# Patient Record
Sex: FEMALE | Race: WHITE | Employment: PART TIME | URBAN - METROPOLITAN AREA
[De-identification: names, ages, dates, MRNs, and addresses within clinical notes are randomized per-mention and may not be internally consistent; named-entity substitution may affect disease eponyms.]

---

## 2017-04-20 ENCOUNTER — HOSPITAL ENCOUNTER (EMERGENCY)
Age: 57
Discharge: HOME | End: 2017-04-21
Attending: EMERGENCY MEDICINE
Payer: COMMERCIAL

## 2017-04-20 VITALS
SYSTOLIC BLOOD PRESSURE: 169 MMHG | OXYGEN SATURATION: 97 % | BODY MASS INDEX: 23.17 KG/M2 | TEMPERATURE: 98.3 F | DIASTOLIC BLOOD PRESSURE: 85 MMHG | RESPIRATION RATE: 16 BRPM | HEART RATE: 100 BPM | WEIGHT: 135 LBS

## 2017-04-20 DIAGNOSIS — N20.0 KIDNEY STONE: ICD-10-CM

## 2017-04-20 DIAGNOSIS — B37.31 MONILIAL VAGINITIS: ICD-10-CM

## 2017-04-20 DIAGNOSIS — N39.0 RECURRENT UTI: Primary | ICD-10-CM

## 2017-04-20 DIAGNOSIS — B37.49 CANDIDURIA: ICD-10-CM

## 2017-04-20 LAB
APPEARANCE UR: ABNORMAL
BACTERIA URNS QL MICRO: NEGATIVE /HPF
BILIRUB UR QL: NEGATIVE
COLOR UR: YELLOW
EPITH CASTS URNS QL MICRO: ABNORMAL /LPF (ref 0–5)
GLUCOSE UR STRIP.AUTO-MCNC: >1000 MG/DL
HGB UR QL STRIP: ABNORMAL
KETONES UR QL STRIP.AUTO: NEGATIVE MG/DL
LEUKOCYTE ESTERASE UR QL STRIP.AUTO: ABNORMAL
NITRITE UR QL STRIP.AUTO: NEGATIVE
PH UR STRIP: 5.5 [PH] (ref 5–8)
PROT UR STRIP-MCNC: NEGATIVE MG/DL
RBC #/AREA URNS HPF: ABNORMAL /HPF (ref 0–5)
SP GR UR REFRACTOMETRY: 1.02 (ref 1–1.03)
UROBILINOGEN UR QL STRIP.AUTO: 0.2 EU/DL (ref 0.2–1)
WBC URNS QL MICRO: ABNORMAL /HPF (ref 0–4)
YEAST URNS QL MICRO: ABNORMAL

## 2017-04-20 PROCEDURE — 87106 FUNGI IDENTIFICATION YEAST: CPT | Performed by: EMERGENCY MEDICINE

## 2017-04-20 PROCEDURE — 87086 URINE CULTURE/COLONY COUNT: CPT | Performed by: EMERGENCY MEDICINE

## 2017-04-20 PROCEDURE — 80053 COMPREHEN METABOLIC PANEL: CPT | Performed by: EMERGENCY MEDICINE

## 2017-04-20 PROCEDURE — 96374 THER/PROPH/DIAG INJ IV PUSH: CPT

## 2017-04-20 PROCEDURE — 81001 URINALYSIS AUTO W/SCOPE: CPT | Performed by: EMERGENCY MEDICINE

## 2017-04-20 PROCEDURE — 99283 EMERGENCY DEPT VISIT LOW MDM: CPT

## 2017-04-20 PROCEDURE — 74011250636 HC RX REV CODE- 250/636: Performed by: EMERGENCY MEDICINE

## 2017-04-20 PROCEDURE — 85025 COMPLETE CBC W/AUTO DIFF WBC: CPT | Performed by: EMERGENCY MEDICINE

## 2017-04-20 PROCEDURE — 96361 HYDRATE IV INFUSION ADD-ON: CPT

## 2017-04-20 PROCEDURE — 96375 TX/PRO/DX INJ NEW DRUG ADDON: CPT

## 2017-04-20 RX ORDER — ONDANSETRON 2 MG/ML
4 INJECTION INTRAMUSCULAR; INTRAVENOUS
Status: COMPLETED | OUTPATIENT
Start: 2017-04-20 | End: 2017-04-20

## 2017-04-20 RX ORDER — KETOROLAC TROMETHAMINE 30 MG/ML
30 INJECTION, SOLUTION INTRAMUSCULAR; INTRAVENOUS
Status: COMPLETED | OUTPATIENT
Start: 2017-04-20 | End: 2017-04-20

## 2017-04-20 RX ADMIN — SODIUM CHLORIDE 1000 ML: 900 INJECTION, SOLUTION INTRAVENOUS at 23:58

## 2017-04-20 RX ADMIN — ONDANSETRON 4 MG: 2 INJECTION INTRAMUSCULAR; INTRAVENOUS at 23:53

## 2017-04-20 RX ADMIN — KETOROLAC TROMETHAMINE 30 MG: 30 INJECTION, SOLUTION INTRAMUSCULAR at 23:54

## 2017-04-21 LAB
ALBUMIN SERPL BCP-MCNC: 3.9 G/DL (ref 3.4–5)
ALBUMIN/GLOB SERPL: 0.9 {RATIO} (ref 0.8–1.7)
ALP SERPL-CCNC: 83 U/L (ref 45–117)
ALT SERPL-CCNC: 16 U/L (ref 13–56)
ANION GAP BLD CALC-SCNC: 11 MMOL/L (ref 3–18)
AST SERPL W P-5'-P-CCNC: 11 U/L (ref 15–37)
BASOPHILS # BLD AUTO: 0 K/UL (ref 0–0.06)
BASOPHILS # BLD: 1 % (ref 0–2)
BILIRUB SERPL-MCNC: 0.4 MG/DL (ref 0.2–1)
BUN SERPL-MCNC: 19 MG/DL (ref 7–18)
BUN/CREAT SERPL: 18 (ref 12–20)
CALCIUM SERPL-MCNC: 9.9 MG/DL (ref 8.5–10.1)
CHLORIDE SERPL-SCNC: 102 MMOL/L (ref 100–108)
CO2 SERPL-SCNC: 25 MMOL/L (ref 21–32)
CREAT SERPL-MCNC: 1.05 MG/DL (ref 0.6–1.3)
DIFFERENTIAL METHOD BLD: ABNORMAL
EOSINOPHIL # BLD: 0.3 K/UL (ref 0–0.4)
EOSINOPHIL NFR BLD: 3 % (ref 0–5)
ERYTHROCYTE [DISTWIDTH] IN BLOOD BY AUTOMATED COUNT: 14.6 % (ref 11.6–14.5)
GLOBULIN SER CALC-MCNC: 4.5 G/DL (ref 2–4)
GLUCOSE SERPL-MCNC: 151 MG/DL (ref 74–99)
HCT VFR BLD AUTO: 35.6 % (ref 35–45)
HGB BLD-MCNC: 11.8 G/DL (ref 12–16)
LYMPHOCYTES # BLD AUTO: 18 % (ref 21–52)
LYMPHOCYTES # BLD: 1.6 K/UL (ref 0.9–3.6)
MCH RBC QN AUTO: 28.3 PG (ref 24–34)
MCHC RBC AUTO-ENTMCNC: 33.1 G/DL (ref 31–37)
MCV RBC AUTO: 85.4 FL (ref 74–97)
MONOCYTES # BLD: 0.5 K/UL (ref 0.05–1.2)
MONOCYTES NFR BLD AUTO: 6 % (ref 3–10)
NEUTS SEG # BLD: 6.4 K/UL (ref 1.8–8)
NEUTS SEG NFR BLD AUTO: 72 % (ref 40–73)
PLATELET # BLD AUTO: 426 K/UL (ref 135–420)
PMV BLD AUTO: 10 FL (ref 9.2–11.8)
POTASSIUM SERPL-SCNC: 3.5 MMOL/L (ref 3.5–5.5)
PROT SERPL-MCNC: 8.4 G/DL (ref 6.4–8.2)
RBC # BLD AUTO: 4.17 M/UL (ref 4.2–5.3)
SODIUM SERPL-SCNC: 138 MMOL/L (ref 136–145)
WBC # BLD AUTO: 8.8 K/UL (ref 4.6–13.2)

## 2017-04-21 PROCEDURE — 74011250637 HC RX REV CODE- 250/637: Performed by: EMERGENCY MEDICINE

## 2017-04-21 RX ORDER — FLUCONAZOLE 200 MG/1
200 TABLET ORAL DAILY
Qty: 14 TAB | Refills: 0 | Status: SHIPPED | OUTPATIENT
Start: 2017-04-21 | End: 2017-05-05

## 2017-04-21 RX ORDER — ONDANSETRON 4 MG/1
4 TABLET, ORALLY DISINTEGRATING ORAL
Qty: 8 TAB | Refills: 0 | Status: SHIPPED | OUTPATIENT
Start: 2017-04-21 | End: 2018-09-22

## 2017-04-21 RX ORDER — HYDROCODONE BITARTRATE AND ACETAMINOPHEN 5; 325 MG/1; MG/1
1 TABLET ORAL
Qty: 6 TAB | Refills: 0 | Status: SHIPPED | OUTPATIENT
Start: 2017-04-21 | End: 2017-08-04

## 2017-04-21 RX ORDER — ACETAMINOPHEN 500 MG
1000 TABLET ORAL
Status: COMPLETED | OUTPATIENT
Start: 2017-04-21 | End: 2017-04-21

## 2017-04-21 RX ORDER — FLUCONAZOLE 150 MG/1
150 TABLET ORAL
Status: COMPLETED | OUTPATIENT
Start: 2017-04-21 | End: 2017-04-21

## 2017-04-21 RX ADMIN — ACETAMINOPHEN 1000 MG: 500 TABLET ORAL at 00:22

## 2017-04-21 RX ADMIN — FLUCONAZOLE 150 MG: 150 TABLET ORAL at 00:22

## 2017-04-21 NOTE — ED PROVIDER NOTES
HPI Comments: Arnav Payan is a 65 y.o. Female with h/o niddm with c/o worsening urinary urgency, freq, vaginal burning, itching for last several days. Seen at [facility] in feb with noted uti, yeast in urine. Completed abx and was given 4 pills by pcp for yeast infection but this did not relieve sx. Tried otc monistat without relief. Now with nausea, increased right flank pain. No fever, diarrhea, blood in urine, cough, sob. Pt is not sexually active and has h/o hysterectomy in remote past. Sx are freq, worse with urination    The history is provided by the patient and medical records. Past Medical History:   Diagnosis Date    Diabetes ([facility] [address]     Kidney stone     Left sided abdominal pain of unknown cause        Past Surgical History:   Procedure Laterality Date    HX CARPAL TUNNEL RELEASE Right     HX  SECTION      HX CHOLECYSTECTOMY      HX HYSTERECTOMY           Family History:   Problem Relation Age of Onset    Parkinson's Disease Mother        Social History     Social History    Marital status:      Spouse name: N/A    Number of children: N/A    Years of education: N/A     Occupational History    Not on file. Social History Main Topics    Smoking status: Never Smoker    Smokeless tobacco: Not on file    Alcohol use No    Drug use: Not on file    Sexual activity: Not on file     Other Topics Concern    Not on file     Social History Narrative         ALLERGIES: Statins-hmg-coa reductase inhibitors    Review of Systems   Constitutional: Negative for fever. HENT: Negative for sore throat. Eyes: Negative for visual disturbance. Respiratory: Negative for cough and shortness of breath. Cardiovascular: Negative for chest pain. Gastrointestinal: Positive for nausea. Negative for constipation, diarrhea and vomiting. Endocrine: Negative for polyuria. Genitourinary: Positive for flank pain, frequency, urgency, vaginal discharge and vaginal pain.  Negative for difficulty urinating, dysuria, hematuria and pelvic pain. Musculoskeletal: Negative for back pain. Skin: Negative for rash. Neurological: Negative for syncope. Psychiatric/Behavioral: Positive for sleep disturbance. All other systems reviewed and are negative. Vitals:    04/20/17 2248   BP: 169/85   Pulse: 100   Resp: 16   Temp: 98.3 °F (36.8 °C)   SpO2: 97%   Weight: 61.2 kg (135 lb)            Physical Exam   Constitutional: She is oriented to person, place, and time. She appears well-developed and well-nourished. No distress. HENT:   Head: Normocephalic and atraumatic. Right Ear: External ear normal.   Left Ear: External ear normal.   Nose: Nose normal.   Mouth/Throat: Uvula is midline, oropharynx is clear and moist and mucous membranes are normal.   Eyes: Conjunctivae are normal. No scleral icterus. Neck: Neck supple. Cardiovascular: Normal rate, regular rhythm, normal heart sounds and intact distal pulses. Pulmonary/Chest: Effort normal and breath sounds normal.   Abdominal: Soft. Normal appearance. She exhibits no distension and no mass. There is no hepatosplenomegaly. There is tenderness in the suprapubic area. There is CVA tenderness. There is no rebound and no guarding. Musculoskeletal: She exhibits no edema. Neurological: She is alert and oriented to person, place, and time. Gait normal.   Skin: Skin is warm and dry. She is not diaphoretic. Psychiatric: Her behavior is normal.   Nursing note and vitals reviewed.        [facility]  ED Course       Procedures  Vitals:  Patient Vitals for the past 12 hrs:   Temp Pulse Resp BP SpO2   04/20/17 2248 98.3 °F (36.8 °C) 100 16 169/85 97 %         Medications ordered:   Medications   sodium chloride 0.9 % bolus infusion 1,000 mL (1,000 mL IntraVENous New Bag 4/20/17 7664)   ketorolac (TORADOL) injection 30 mg (30 mg IntraVENous Given 4/20/17 6894)   ondansetron (ZOFRAN) injection 4 mg (4 mg IntraVENous Given 4/20/17 9989)   fluconazole (DIFLUCAN) tablet 150 mg (150 mg Oral Given 4/21/17 0022)   acetaminophen (TYLENOL) tablet 1,000 mg (1,000 mg Oral Given 4/21/17 0022)         Lab findings:  Recent Results (from the past 12 hour(s))   URINALYSIS W/ RFLX MICROSCOPIC    Collection Time: 04/20/17 11:15 PM   Result Value Ref Range    Color YELLOW      Appearance CLOUDY      Specific gravity 1.018 1.005 - 1.030      pH (UA) 5.5 5.0 - 8.0      Protein NEGATIVE  NEG mg/dL    Glucose >1000 (A) NEG mg/dL    Ketone NEGATIVE  NEG mg/dL    Bilirubin NEGATIVE  NEG      Blood SMALL (A) NEG      Urobilinogen 0.2 0.2 - 1.0 EU/dL    Nitrites NEGATIVE  NEG      Leukocyte Esterase MODERATE (A) NEG     URINE MICROSCOPIC ONLY    Collection Time: 04/20/17 11:15 PM   Result Value Ref Range    WBC TOO NUMEROUS TO COUNT 0 - 4 /hpf    RBC 0 to 3 0 - 5 /hpf    Epithelial cells 1+ 0 - 5 /lpf    Bacteria NEGATIVE  NEG /hpf    Yeast 3+ (A) NEG   CBC WITH AUTOMATED DIFF    Collection Time: 04/20/17 11:45 PM   Result Value Ref Range    WBC 8.8 4.6 - 13.2 K/uL    RBC 4.17 (L) 4.20 - 5.30 M/uL    HGB 11.8 (L) 12.0 - 16.0 g/dL    HCT 35.6 35.0 - 45.0 %    MCV 85.4 74.0 - 97.0 FL    MCH 28.3 24.0 - 34.0 PG    MCHC 33.1 31.0 - 37.0 g/dL    RDW 14.6 (H) 11.6 - 14.5 %    PLATELET 786 (H) 262 - 420 K/uL    MPV 10.0 9.2 - 11.8 FL    NEUTROPHILS 72 40 - 73 %    LYMPHOCYTES 18 (L) 21 - 52 %    MONOCYTES 6 3 - 10 %    EOSINOPHILS 3 0 - 5 %    BASOPHILS 1 0 - 2 %    ABS. NEUTROPHILS 6.4 1.8 - 8.0 K/UL    ABS. LYMPHOCYTES 1.6 0.9 - 3.6 K/UL    ABS. MONOCYTES 0.5 0.05 - 1.2 K/UL    ABS. EOSINOPHILS 0.3 0.0 - 0.4 K/UL    ABS.  BASOPHILS 0.0 0.0 - 0.06 K/UL    DF AUTOMATED     METABOLIC PANEL, COMPREHENSIVE    Collection Time: 04/20/17 11:45 PM   Result Value Ref Range    Sodium 138 136 - 145 mmol/L    Potassium 3.5 3.5 - 5.5 mmol/L    Chloride 102 100 - 108 mmol/L    CO2 25 21 - 32 mmol/L    Anion gap 11 3.0 - 18 mmol/L    Glucose 151 (H) 74 - 99 mg/dL    BUN 19 (H) 7.0 - 18 MG/DL    Creatinine 1. 05 0.6 - 1.3 MG/DL    BUN/Creatinine ratio 18 12 - 20      GFR est AA >60 >60 ml/min/1.73m2    GFR est non-AA 54 (L) >60 ml/min/1.73m2    Calcium 9.9 8.5 - 10.1 MG/DL    Bilirubin, total 0.4 0.2 - 1.0 MG/DL    ALT (SGPT) 16 13 - 56 U/L    AST (SGOT) 11 (L) 15 - 37 U/L    Alk. phosphatase 83 45 - 117 U/L    Protein, total 8.4 (H) 6.4 - 8.2 g/dL    Albumin 3.9 3.4 - 5.0 g/dL    Globulin 4.5 (H) 2.0 - 4.0 g/dL    A-G Ratio 0.9 0.8 - 1.7         EKG interpretation by ED Physician:      X-Ray, CT or other radiology findings or impressions:  No orders to display     2/23 sentara: 1. Mild right hydronephrosis and hydroureter with no obstructing stones identified.  Probably recently passed tiny stone with mild residual edema. 2. No obstructive uropathy in the left kidney. Progress notes, Consult notes or additional Procedure notes:   Given persistent yeast in urine will place on 2 weeks of diflucan pending cultures which was d/w pt along with need for f/u with urology; if has resistant candida may need other therapy    Reevaluation of patient:   Stable for dc    Disposition:  Diagnosis:   1. Recurrent UTI    2. Monilial vaginitis    3. Candiduria    4. Kidney stone        Disposition: home      Follow-up Information     Follow up With Details Comments [address] [facility] MD [address]  [address] [place] [address]  C/Lacho Finn MD Schedule an appointment as soon as possible for a visit  [address] [place] [address]  [phone]              Patient's Medications   Start Taking    FLUCONAZOLE (DIFLUCAN) 200 MG TABLET    Take 1 Tab by mouth daily for 14 days. FDA advises cautious prescribing of oral fluconazole in pregnancy. Continue Taking    ASPIRIN DELAYED-RELEASE 81 MG TABLET    Take  by mouth daily. DAPAGLIFLOZIN (FARXIGA) 10 MG TAB    Take 10 mg by mouth daily.     ERGOCALCIFEROL (ERGOCALCIFEROL) 50,000 UNIT CAPSULE TAKE 1 CAP BY MOUTH ONCE A WEEK    FLUCONAZOLE (DIFLUCAN) 100 MG TABLET    Take two tablets po for 1 day, 1 tablet po for 2 days. GLIPIZIDE (GLUCOTROL) 10 MG TABLET    TAKE 1 TABLET BY MOUTH TWICE A DAY WITH MEALS FOR DIABETES    METFORMIN (GLUCOPHAGE) 1,000 MG TABLET    TAKE 1 TABLET BY MOUTH TWICE A DAY    MULTIVITAMIN (ONE A DAY) TABLET    Take 1 Tab by mouth daily. OMEPRAZOLE (PRILOSEC) 40 MG CAPSULE    TAKE ONE CAPSULE BY MOUTH 1/2 HOUR BEFORE BREAKFAST   These Medications have changed    Modified Medication Previous Medication    HYDROCODONE-ACETAMINOPHEN (NORCO) 5-325 MG PER TABLET HYDROcodone-acetaminophen (NORCO) 5-325 mg per tablet       Take 1 Tab by mouth every six (6) hours as needed for Pain. Max Daily Amount: 4 Tabs. Take 1 Tab by mouth every six (6) hours as needed for Pain. Max Daily Amount: 4 Tabs. Stop Taking    HYDROCODONE-ACETAMINOPHEN (NORCO) 5-325 MG PER TABLET    Take 1 Tab by mouth every four (4) hours as needed for Pain. Max Daily Amount: 6 Tabs.     IBUPROFEN (MOTRIN) 800 MG TABLET    TAKE 1 TABLET BY MOUTH EVERY 8 HOURS WITH FOOD FOR INFLAMMATION    ONDANSETRON (ZOFRAN ODT) 4 MG DISINTEGRATING TABLET    DISSOLVE 1 TAB ON TONGUE EVERY 6 HOURS AS NEEDED FOR NAUSEA    OXYCODONE-ACETAMINOPHEN (PERCOCET) 5-325 MG PER TABLET    TAKE 1-2 TABLETS BY MOUTH EVERY 4-6 HOURS IF NEEDED FOR PAIN

## 2017-04-21 NOTE — ED TRIAGE NOTES
\"I think its a urinary tract infection. I took some flomax last night and it went away.   I have a yeast infection that Im trying to get rid of.\"

## 2017-04-25 LAB
BACTERIA SPEC CULT: ABNORMAL
BACTERIA SPEC CULT: ABNORMAL
SERVICE CMNT-IMP: ABNORMAL

## 2018-06-05 ENCOUNTER — HOSPITAL ENCOUNTER (EMERGENCY)
Age: 58
Discharge: HOME | End: 2018-06-05
Attending: EMERGENCY MEDICINE
Payer: SELF-PAY

## 2018-06-05 VITALS
HEIGHT: 64 IN | DIASTOLIC BLOOD PRESSURE: 74 MMHG | OXYGEN SATURATION: 100 % | HEART RATE: 72 BPM | TEMPERATURE: 98.6 F | WEIGHT: 132 LBS | SYSTOLIC BLOOD PRESSURE: 137 MMHG | BODY MASS INDEX: 22.53 KG/M2 | RESPIRATION RATE: 16 BRPM

## 2018-06-05 DIAGNOSIS — N39.0 ACUTE UTI (URINARY TRACT INFECTION): Primary | ICD-10-CM

## 2018-06-05 LAB
APPEARANCE UR: ABNORMAL
BACTERIA URNS QL MICRO: ABNORMAL /HPF
BILIRUB UR QL: NEGATIVE
COLOR UR: YELLOW
EPITH CASTS URNS QL MICRO: ABNORMAL /LPF (ref 0–5)
GLUCOSE UR STRIP.AUTO-MCNC: >1000 MG/DL
HGB UR QL STRIP: NEGATIVE
KETONES UR QL STRIP.AUTO: NEGATIVE MG/DL
LEUKOCYTE ESTERASE UR QL STRIP.AUTO: ABNORMAL
NITRITE UR QL STRIP.AUTO: NEGATIVE
PH UR STRIP: 5.5 [PH] (ref 5–8)
PROT UR STRIP-MCNC: ABNORMAL MG/DL
RBC #/AREA URNS HPF: ABNORMAL /HPF (ref 0–5)
SP GR UR REFRACTOMETRY: 1.02 (ref 1–1.03)
UROBILINOGEN UR QL STRIP.AUTO: 0.2 EU/DL (ref 0.2–1)
WBC URNS QL MICRO: ABNORMAL /HPF (ref 0–4)

## 2018-06-05 PROCEDURE — 87086 URINE CULTURE/COLONY COUNT: CPT | Performed by: NURSE PRACTITIONER

## 2018-06-05 PROCEDURE — 81001 URINALYSIS AUTO W/SCOPE: CPT | Performed by: NURSE PRACTITIONER

## 2018-06-05 PROCEDURE — 99282 EMERGENCY DEPT VISIT SF MDM: CPT

## 2018-06-05 RX ORDER — FLUCONAZOLE 150 MG/1
TABLET ORAL
Qty: 1 TAB | Refills: 0 | Status: SHIPPED | OUTPATIENT
Start: 2018-06-05 | End: 2018-09-22

## 2018-06-05 RX ORDER — SULFAMETHOXAZOLE AND TRIMETHOPRIM 800; 160 MG/1; MG/1
1 TABLET ORAL 2 TIMES DAILY
Qty: 14 TAB | Refills: 0 | Status: SHIPPED | OUTPATIENT
Start: 2018-06-05 | End: 2018-09-22

## 2018-06-05 RX ORDER — IBUPROFEN 800 MG/1
800 TABLET ORAL
Qty: 20 TAB | Refills: 0 | Status: SHIPPED | OUTPATIENT
Start: 2018-06-05 | End: 2018-06-12

## 2018-06-05 NOTE — DISCHARGE INSTRUCTIONS
Urinary Tract Infection in Women: Care Instructions  Your Care Instructions    A urinary tract infection, or UTI, is a general term for an infection anywhere between the kidneys and the urethra (where urine comes out). Most UTIs are bladder infections. They often cause pain or burning when you urinate. UTIs are caused by bacteria and can be cured with antibiotics. Be sure to complete your treatment so that the infection goes away. Follow-up care is a key part of your treatment and safety. Be sure to make and go to all appointments, and call your doctor if you are having problems. It's also a good idea to know your test results and keep a list of the medicines you take. How can you care for yourself at home? · Take your antibiotics as directed. Do not stop taking them just because you feel better. You need to take the full course of antibiotics. · Drink extra water and other fluids for the next day or two. This may help wash out the bacteria that are causing the infection. (If you have kidney, heart, or liver disease and have to limit fluids, talk with your doctor before you increase your fluid intake.)  · Avoid drinks that are carbonated or have caffeine. They can irritate the bladder. · Urinate often. Try to empty your bladder each time. · To relieve pain, take a hot bath or lay a heating pad set on low over your lower belly or genital area. Never go to sleep with a heating pad in place. To prevent UTIs  · Drink plenty of water each day. This helps you urinate often, which clears bacteria from your system. (If you have kidney, heart, or liver disease and have to limit fluids, talk with your doctor before you increase your fluid intake.)  · Urinate when you need to. · Urinate right after you have sex. · Change sanitary pads often. · Avoid douches, bubble baths, feminine hygiene sprays, and other feminine hygiene products that have deodorants.   · After going to the bathroom, wipe from front to back.  When should you call for help? Call your doctor now or seek immediate medical care if:  ? · Symptoms such as fever, chills, nausea, or vomiting get worse or appear for the first time. ? · You have new pain in your back just below your rib cage. This is called flank pain. ? · There is new blood or pus in your urine. ? · You have any problems with your antibiotic medicine. ? Watch closely for changes in your health, and be sure to contact your doctor if:  ? · You are not getting better after taking an antibiotic for 2 days. ? · Your symptoms go away but then come back. Where can you learn more? Go to http://song-tigre.info/. Enter M186 in the search box to learn more about \"Urinary Tract Infection in Women: Care Instructions. \"  Current as of: May 12, 2017  Content Version: 11.4  © 8811-8697 [employer]. Care instructions adapted under license by [employer] (which disclaims liability or warranty for this information). If you have questions about a medical condition or this instruction, always ask your healthcare professional. [address] any warranty or liability for your use of this information. [employer] Activation    Thank you for requesting access to [facility]. Please follow the instructions below to securely access and download your online medical record. [employer] allows you to send messages to your doctor, view your test results, renew your prescriptions, schedule appointments, and more. How Do I Sign Up? 1. In your internet browser, go to [email or web address]  2. Click on the First Time User? Click Here link in the Sign In box. You will be redirect to the New Member Sign Up page. 3. Enter your [facility] Access Code exactly as it appears below. You will not need to use this code after youve completed the sign-up process. If you do not sign up before the expiration date, you must request a new code.     MyChart Access Code: [email or web address]  Expires: 9/3/2018 11:10 AM (This is the date your [employer] access code will )    4. Enter the last four digits of your Social Security Number (xxxx) and Date of Birth (mm/dd/yyyy) as indicated and click Submit. You will be taken to the next sign-up page. 5. Create a iContainerst ID. This will be your [employer] login ID and cannot be changed, so think of one that is secure and easy to remember. 6. Create a [employer] password. You can change your password at any time. 7. Enter your Password Reset Question and Answer. This can be used at a later time if you forget your password. 8. Enter your e-mail address. You will receive e-mail notification when new information is available in [address] 9. Click Sign Up. You can now view and download portions of your medical record. 10. Click the Download Summary menu link to download a portable copy of your medical information. Additional Information    If you have questions, please visit the Frequently Asked Questions section of the [employer] website at [email or web address] Remember, [employer] is NOT to be used for urgent needs. For medical emergencies, dial 911.

## 2018-06-05 NOTE — ED PROVIDER NOTES
HPI Comments: Rachael Wade is a 63year old female who presetns to the ED with a c/o urinary burning, urgency and frequency. States  She had a UTI last month, and thinks she has another. She has not self medicated this pain, and nothing makes it better or worse. Patient is a 63 y.o. female presenting with frequency and urinary pain. The history is provided by the patient. History limited by: No communication barrier. Urinary Frequency    This is a new problem. The problem occurs every urination. The problem has not changed since onset. The pain is moderate. Associated symptoms include frequency. The patient is not pregnant. She has tried nothing for the symptoms. The treatment provided no relief. Urinary Pain    Associated symptoms include frequency. The patient is not pregnant. Past Medical History:   Diagnosis Date    Candidal UTI (urinary tract infection)     Diabetes ([address]     Fracture of femoral neck, right (HCC)     History of renal stent     HLD (hyperlipidemia)     Kidney stone     Left sided abdominal pain of unknown cause     Left ureteral stone     Pyelonephritis     Urinary frequency     Vitamin D deficiency        Past Surgical History:   Procedure Laterality Date    HX APPENDECTOMY      HX CARPAL TUNNEL RELEASE Right     HX  SECTION      HX CHOLECYSTECTOMY      HX HYSTERECTOMY      HX OTHER SURGICAL  2016    PELVIS/ HIP JOINT-FRACTURE/ DISLOCATION    HX UROLOGICAL  2017    cystoscopy bilateral retrograde pyelograms, right 7-Wallisian x 24 cm double-J ureteral stent placement.     HX UROLOGICAL  2017     [facility]- Cystoscopy, left ureteroscopy, laser lithotripsy, stone extraction, and stent placement, Dr Rich Camacho         Family History:   Problem Relation Age of Onset    Parkinson's Disease Mother        Social History     Social History    Marital status:      Spouse name: N/A    Number of children: N/A    Years of education: N/A Occupational History    Not on file. Social History Main Topics    Smoking status: Former Smoker    Smokeless tobacco: Never Used    Alcohol use No    Drug use: No    Sexual activity: Not on file     Other Topics Concern    Not on file     Social History Narrative         ALLERGIES: Statins-hmg-coa reductase inhibitors    Review of Systems   Constitutional: Negative. HENT: Negative. Eyes: Negative. Respiratory: Negative. Cardiovascular: Negative. Gastrointestinal: Negative. Endocrine: Negative. Genitourinary: Positive for frequency. Musculoskeletal: Negative. Skin: Negative. Allergic/Immunologic: Negative. Neurological: Negative. Hematological: Negative. Psychiatric/Behavioral: Negative. Vitals:    06/05/18 1114   BP: 128/84   Pulse: 91   Resp: 16   Temp: 98.2 °F (36.8 °C)   SpO2: 100%   Weight: 59.9 kg (132 lb)   Height: 5' 4\" (1.626 m)            Physical Exam   Constitutional: She is oriented to person, place, and time. She appears well-developed and well-nourished. No distress. HENT:   Head: Normocephalic and atraumatic. Eyes: EOM are normal. Pupils are equal, round, and reactive to light. Neck: Normal range of motion. Neck supple. Cardiovascular: Normal rate, regular rhythm, normal heart sounds and intact distal pulses. Pulmonary/Chest: No respiratory distress. She has no wheezes. She has no rales. Abdominal: Soft. Bowel sounds are normal. There is no tenderness. There is no rebound and no guarding. Genitourinary:   Genitourinary Comments: NE   Musculoskeletal: Normal range of motion. Neurological: She is alert and oriented to person, place, and time. No cranial nerve deficit. She exhibits normal muscle tone. Coordination normal.   Skin: Skin is warm and dry. Psychiatric: She has a normal mood and affect. Nursing note and vitals reviewed.        MDM  Number of Diagnoses or Management Options  Acute UTI (urinary tract infection): Diagnosis management comments: MDM:  Will start the Pt on Septra, and DC to home. Alexys Bazan NP  1:34 PM           Amount and/or Complexity of Data Reviewed  Clinical lab tests: ordered and reviewed    Risk of Complications, Morbidity, and/or Mortality  Presenting problems: low  Diagnostic procedures: low  Management options: low    Patient Progress  Patient progress: stable        ED Course       Procedures    Diagnosis:   1. Acute UTI (urinary tract infection)          Disposition:   Discharged to Home. Follow-up Information     Follow up With Details Comments Contact Padmini Dexter MD  today to arrange follow up [address]  [phone]            Patient's Medications   Start Taking    IBUPROFEN (MOTRIN) 800 MG TABLET    Take 1 Tab by mouth every six (6) hours as needed for Pain for up to 7 days. TRIMETHOPRIM-SULFAMETHOXAZOLE (BACTRIM DS, SEPTRA DS) 160-800 MG PER TABLET    Take 1 Tab by mouth two (2) times a day. Continue Taking    ASPIRIN DELAYED-RELEASE 81 MG TABLET    Take  by mouth daily. ERGOCALCIFEROL (ERGOCALCIFEROL) 50,000 UNIT CAPSULE    TAKE 1 CAP BY MOUTH ONCE A WEEK    GEMFIBROZIL (LOPID) 600 MG TABLET    TAKE 1 TABLET BY MOUTH TWICE A DAY FOR TRIGLYCERIDES    INSULIN LISPRO (HUMALOG) 100 UNIT/ML INJECTION    by SubCUTAneous route. METFORMIN (GLUCOPHAGE) 1,000 MG TABLET    TAKE 1 TABLET BY MOUTH TWICE A DAY    MULTIVITAMIN (ONE A DAY) TABLET    Take 1 Tab by mouth daily. ONDANSETRON (ZOFRAN ODT) 4 MG DISINTEGRATING TABLET    Take 1 Tab by mouth every eight (8) hours as needed for Nausea. These Medications have changed    No medications on file   Stop Taking    FLUCONAZOLE (DIFLUCAN) 100 MG TABLET    Take two tablets po for 1 day, 1 tablet po for 2 days.     OMEPRAZOLE (PRILOSEC) 40 MG CAPSULE    TAKE ONE CAPSULE BY MOUTH 1/2 HOUR BEFORE BREAKFAST    OXYBUTYNIN CHLORIDE XL (DITROPAN XL) 10 MG CR TABLET    TAKE 1 TAB BY MOUTH DAILY.

## 2018-06-06 LAB
BACTERIA SPEC CULT: NORMAL
SERVICE CMNT-IMP: NORMAL

## 2018-09-21 ENCOUNTER — HOSPITAL ENCOUNTER (EMERGENCY)
Age: 58
Discharge: HOME | End: 2018-09-22
Attending: EMERGENCY MEDICINE
Payer: SELF-PAY

## 2018-09-21 DIAGNOSIS — E86.0 DEHYDRATION: ICD-10-CM

## 2018-09-21 DIAGNOSIS — N17.9 AKI (ACUTE KIDNEY INJURY) (HCC): ICD-10-CM

## 2018-09-21 DIAGNOSIS — R73.9 HYPERGLYCEMIA: ICD-10-CM

## 2018-09-21 DIAGNOSIS — B34.9 VIRAL SYNDROME: ICD-10-CM

## 2018-09-21 DIAGNOSIS — N30.00 ACUTE CYSTITIS WITHOUT HEMATURIA: Primary | ICD-10-CM

## 2018-09-21 LAB
ALBUMIN SERPL-MCNC: 4.2 G/DL (ref 3.4–5)
ALBUMIN/GLOB SERPL: 1.1 {RATIO} (ref 0.8–1.7)
ALP SERPL-CCNC: 62 U/L (ref 45–117)
ALT SERPL-CCNC: 22 U/L (ref 13–56)
ANION GAP SERPL CALC-SCNC: 10 MMOL/L (ref 3–18)
APPEARANCE UR: CLEAR
AST SERPL-CCNC: 16 U/L (ref 15–37)
BACTERIA URNS QL MICRO: ABNORMAL /HPF
BASOPHILS # BLD: 0.1 K/UL (ref 0–0.1)
BASOPHILS NFR BLD: 1 % (ref 0–2)
BILIRUB SERPL-MCNC: 0.5 MG/DL (ref 0.2–1)
BILIRUB UR QL: NEGATIVE
BUN SERPL-MCNC: 25 MG/DL (ref 7–18)
BUN/CREAT SERPL: 19 (ref 12–20)
CALCIUM SERPL-MCNC: 10.3 MG/DL (ref 8.5–10.1)
CHLORIDE SERPL-SCNC: 98 MMOL/L (ref 100–108)
CO2 SERPL-SCNC: 25 MMOL/L (ref 21–32)
COLOR UR: YELLOW
CREAT SERPL-MCNC: 1.31 MG/DL (ref 0.6–1.3)
DIFFERENTIAL METHOD BLD: ABNORMAL
EOSINOPHIL # BLD: 0.4 K/UL (ref 0–0.4)
EOSINOPHIL NFR BLD: 4 % (ref 0–5)
EPITH CASTS URNS QL MICRO: ABNORMAL /LPF (ref 0–5)
ERYTHROCYTE [DISTWIDTH] IN BLOOD BY AUTOMATED COUNT: 12.3 % (ref 11.6–14.5)
FLUAV AG NPH QL IA: NEGATIVE
FLUBV AG NOSE QL IA: NEGATIVE
GLOBULIN SER CALC-MCNC: 3.8 G/DL (ref 2–4)
GLUCOSE BLD STRIP.AUTO-MCNC: 292 MG/DL (ref 70–110)
GLUCOSE SERPL-MCNC: 301 MG/DL (ref 74–99)
GLUCOSE UR STRIP.AUTO-MCNC: 500 MG/DL
HCT VFR BLD AUTO: 38.8 % (ref 35–45)
HGB BLD-MCNC: 13.4 G/DL (ref 12–16)
HGB UR QL STRIP: NEGATIVE
KETONES UR QL STRIP.AUTO: NEGATIVE MG/DL
LEUKOCYTE ESTERASE UR QL STRIP.AUTO: ABNORMAL
LIPASE SERPL-CCNC: 266 U/L (ref 73–393)
LYMPHOCYTES # BLD: 2 K/UL (ref 0.9–3.6)
LYMPHOCYTES NFR BLD: 20 % (ref 21–52)
MCH RBC QN AUTO: 30 PG (ref 24–34)
MCHC RBC AUTO-ENTMCNC: 34.5 G/DL (ref 31–37)
MCV RBC AUTO: 87 FL (ref 74–97)
MONOCYTES # BLD: 0.6 K/UL (ref 0.05–1.2)
MONOCYTES NFR BLD: 6 % (ref 3–10)
NEUTS SEG # BLD: 6.9 K/UL (ref 1.8–8)
NEUTS SEG NFR BLD: 69 % (ref 40–73)
NITRITE UR QL STRIP.AUTO: NEGATIVE
PH UR STRIP: 5.5 [PH] (ref 5–8)
PLATELET # BLD AUTO: 319 K/UL (ref 135–420)
PMV BLD AUTO: 10.2 FL (ref 9.2–11.8)
POTASSIUM SERPL-SCNC: 4.3 MMOL/L (ref 3.5–5.5)
PROT SERPL-MCNC: 8 G/DL (ref 6.4–8.2)
PROT UR STRIP-MCNC: NEGATIVE MG/DL
RBC # BLD AUTO: 4.46 M/UL (ref 4.2–5.3)
RBC #/AREA URNS HPF: NEGATIVE /HPF (ref 0–5)
SODIUM SERPL-SCNC: 133 MMOL/L (ref 136–145)
SP GR UR REFRACTOMETRY: 1.01 (ref 1–1.03)
UROBILINOGEN UR QL STRIP.AUTO: 0.2 EU/DL (ref 0.2–1)
WBC # BLD AUTO: 9.9 K/UL (ref 4.6–13.2)
WBC URNS QL MICRO: ABNORMAL /HPF (ref 0–4)

## 2018-09-21 PROCEDURE — 74011000258 HC RX REV CODE- 258: Performed by: EMERGENCY MEDICINE

## 2018-09-21 PROCEDURE — 74011250636 HC RX REV CODE- 250/636: Performed by: EMERGENCY MEDICINE

## 2018-09-21 PROCEDURE — 80053 COMPREHEN METABOLIC PANEL: CPT | Performed by: EMERGENCY MEDICINE

## 2018-09-21 PROCEDURE — 82962 GLUCOSE BLOOD TEST: CPT

## 2018-09-21 PROCEDURE — 81001 URINALYSIS AUTO W/SCOPE: CPT | Performed by: EMERGENCY MEDICINE

## 2018-09-21 PROCEDURE — 87804 INFLUENZA ASSAY W/OPTIC: CPT | Performed by: EMERGENCY MEDICINE

## 2018-09-21 PROCEDURE — 83690 ASSAY OF LIPASE: CPT | Performed by: EMERGENCY MEDICINE

## 2018-09-21 PROCEDURE — 99285 EMERGENCY DEPT VISIT HI MDM: CPT

## 2018-09-21 PROCEDURE — 96375 TX/PRO/DX INJ NEW DRUG ADDON: CPT

## 2018-09-21 PROCEDURE — 85025 COMPLETE CBC W/AUTO DIFF WBC: CPT | Performed by: EMERGENCY MEDICINE

## 2018-09-21 PROCEDURE — 96365 THER/PROPH/DIAG IV INF INIT: CPT

## 2018-09-21 RX ORDER — ONDANSETRON 2 MG/ML
4 INJECTION INTRAMUSCULAR; INTRAVENOUS
Status: COMPLETED | OUTPATIENT
Start: 2018-09-21 | End: 2018-09-21

## 2018-09-21 RX ORDER — PROCHLORPERAZINE EDISYLATE 5 MG/ML
10 INJECTION INTRAMUSCULAR; INTRAVENOUS
Status: DISCONTINUED | OUTPATIENT
Start: 2018-09-21 | End: 2018-09-22 | Stop reason: HOSPADM

## 2018-09-21 RX ORDER — FAMOTIDINE 10 MG/ML
20 INJECTION INTRAVENOUS
Status: COMPLETED | OUTPATIENT
Start: 2018-09-21 | End: 2018-09-21

## 2018-09-21 RX ORDER — INSULIN GLARGINE 100 [IU]/ML
16 INJECTION, SOLUTION SUBCUTANEOUS
COMMUNITY

## 2018-09-21 RX ORDER — DIPHENHYDRAMINE HYDROCHLORIDE 50 MG/ML
25 INJECTION, SOLUTION INTRAMUSCULAR; INTRAVENOUS
Status: COMPLETED | OUTPATIENT
Start: 2018-09-21 | End: 2018-09-21

## 2018-09-21 RX ADMIN — SODIUM CHLORIDE 1000 ML: 900 INJECTION, SOLUTION INTRAVENOUS at 22:18

## 2018-09-21 RX ADMIN — DIPHENHYDRAMINE HYDROCHLORIDE 25 MG: 50 INJECTION, SOLUTION INTRAMUSCULAR; INTRAVENOUS at 22:55

## 2018-09-21 RX ADMIN — CEFTRIAXONE 1 G: 1 INJECTION, POWDER, FOR SOLUTION INTRAMUSCULAR; INTRAVENOUS at 22:57

## 2018-09-21 RX ADMIN — SODIUM CHLORIDE 1000 ML: 900 INJECTION, SOLUTION INTRAVENOUS at 22:54

## 2018-09-21 RX ADMIN — FAMOTIDINE 20 MG: 10 INJECTION, SOLUTION INTRAVENOUS at 22:19

## 2018-09-21 RX ADMIN — ONDANSETRON 4 MG: 2 INJECTION INTRAMUSCULAR; INTRAVENOUS at 22:18

## 2018-09-21 RX ADMIN — PROCHLORPERAZINE EDISYLATE 10 MG: 5 INJECTION INTRAMUSCULAR; INTRAVENOUS at 22:55

## 2018-09-21 NOTE — LETTER
[facility] EMERGENCY DEPT 
[address] [place] [address] [id number] [phone] Work/School Note Date: 9/21/2018 To Whom It May concern: 
 
Celia Knight was seen and treated today in the emergency room by the following provider(s): 
Attending Provider: Carli Page MD. Celia Knight may return to work on 9/23/18. Sincerely, Niesha Kebede RN

## 2018-09-22 VITALS
RESPIRATION RATE: 14 BRPM | OXYGEN SATURATION: 98 % | WEIGHT: 139 LBS | BODY MASS INDEX: 23.73 KG/M2 | HEART RATE: 104 BPM | DIASTOLIC BLOOD PRESSURE: 60 MMHG | SYSTOLIC BLOOD PRESSURE: 130 MMHG | HEIGHT: 64 IN | TEMPERATURE: 98.8 F

## 2018-09-22 LAB — GLUCOSE BLD STRIP.AUTO-MCNC: 198 MG/DL (ref 70–110)

## 2018-09-22 PROCEDURE — 82962 GLUCOSE BLOOD TEST: CPT

## 2018-09-22 RX ORDER — CIPROFLOXACIN 500 MG/1
500 TABLET ORAL 2 TIMES DAILY
Qty: 14 TAB | Refills: 0 | Status: SHIPPED | OUTPATIENT
Start: 2018-09-22 | End: 2018-09-29

## 2018-09-22 RX ORDER — FLUCONAZOLE 150 MG/1
150 TABLET ORAL
Qty: 1 TAB | Refills: 0 | Status: SHIPPED | OUTPATIENT
Start: 2018-09-22 | End: 2018-09-22

## 2018-09-22 RX ORDER — ONDANSETRON 4 MG/1
4 TABLET, ORALLY DISINTEGRATING ORAL
Qty: 15 TAB | Refills: 0 | Status: SHIPPED | OUTPATIENT
Start: 2018-09-22 | End: 2019-04-14

## 2018-09-22 NOTE — ED TRIAGE NOTES
Sinus congestion and cough for a few days. Today developed n/v and abdominal pain. Possible exposure to flu.

## 2018-09-22 NOTE — DISCHARGE INSTRUCTIONS
Dehydration: Care Instructions  Your Care Instructions  Dehydration happens when your body loses too much fluid. This might happen when you do not drink enough water or you lose large amounts of fluids from your body because of diarrhea, vomiting, or sweating. Severe dehydration can be life-threatening. Water and minerals called electrolytes help put your body fluids back in balance. Learn the early signs of fluid loss, and drink more fluids to prevent dehydration. Follow-up care is a key part of your treatment and safety. Be sure to make and go to all appointments, and call your doctor if you are having problems. It's also a good idea to know your test results and keep a list of the medicines you take. How can you care for yourself at home? · To prevent dehydration, drink plenty of fluids, enough so that your urine is light yellow or clear like water. Choose water and other caffeine-free clear liquids until you feel better. If you have kidney, heart, or liver disease and have to limit fluids, talk with your doctor before you increase the amount of fluids you drink. · If you do not feel like eating or drinking, try taking small sips of water, sports drinks, or other rehydration drinks. · Get plenty of rest.  To prevent dehydration  · Add more fluids to your diet and daily routine, unless your doctor has told you not to. · During hot weather, drink more fluids. Drink even more fluids if you exercise a lot. Stay away from drinks with alcohol or caffeine. · Watch for the symptoms of dehydration. These include:  ¨ A dry, sticky mouth. ¨ Dark yellow urine, and not much of it. ¨ Dry and sunken eyes. ¨ Feeling very tired. · Learn what problems can lead to dehydration. These include:  ¨ Diarrhea, fever, and vomiting. ¨ Any illness with a fever, such as pneumonia or the flu. ¨ Activities that cause heavy sweating, such as endurance races and heavy outdoor work in hot or humid weather.   ¨ Alcohol or drug abuse or withdrawal.  ¨ Certain medicines, such as cold and allergy pills (antihistamines), diet pills (diuretics), and laxatives. ¨ Certain diseases, such as diabetes, cancer, and heart or kidney disease. When should you call for help? Call 911 anytime you think you may need emergency care. For example, call if:    · You passed out (lost consciousness).    Call your doctor now or seek immediate medical care if:    · You are confused and cannot think clearly.     · You are dizzy or lightheaded, or you feel like you may faint.     · You have signs of needing more fluids. You have sunken eyes and a dry mouth, and you pass only a little dark urine.     · You cannot keep fluids down.    Watch closely for changes in your health, and be sure to contact your doctor if:    · You are not making tears.     · Your skin is very dry and sags slowly back into place after you pinch it.     · Your mouth and eyes are very dry. Where can you learn more? Go to http://song-tigre.info/. Enter H187 in the search box to learn more about \"Dehydration: Care Instructions. \"  Current as of: November 20, 2017  Content Version: 11.7  © 7869-2555 [employer]. Care instructions adapted under license by [employer] (which disclaims liability or warranty for this information). If you have questions about a medical condition or this instruction, always ask your healthcare professional. [place] [address] any warranty or liability for your use of this information. Learning About High Blood Sugar  What is high blood sugar? Your body turns the food you eat into glucose (sugar), which it uses for energy. But if your body isn't able to use the sugar right away, it can build up in your blood and lead to high blood sugar. When the amount of sugar in your blood stays too high for too much of the time, you may have diabetes.  Diabetes is a disease that can cause serious health problems. The good news is that lifestyle changes may help you get your blood sugar back to normal and avoid or delay diabetes. What causes high blood sugar? Sugar (glucose) can build up in your blood if you:  · Are overweight. · Have a family history of diabetes. · Take certain medicines, such as steroids. What are the symptoms? Having high blood sugar may not cause any symptoms at all. Or it may make you feel very thirsty or very hungry. You may also urinate more often than usual, have blurry vision, or lose weight without trying. How is high blood sugar treated? You can take steps to lower your blood sugar level if you understand what makes it get higher. Your doctor may want you to learn how to test your blood sugar level at home. Then you can see how illness, stress, or different kinds of food or medicine raise or lower your blood sugar level. Other tests may be needed to see if you have diabetes. How can you prevent high blood sugar? · Watch your weight. If you're overweight, losing just a small amount of weight may help. Reducing fat around your waist is most important. · Limit the amount of calories, sweets, and unhealthy fat you eat. Ask your doctor if a dietitian can help you. A registered dietitian can help you create meal plans that fit your lifestyle. · Get at least 30 minutes of exercise on most days of the week. Exercise helps control your blood sugar. It also helps you maintain a healthy weight. Walking is a good choice. You also may want to do other activities, such as running, swimming, cycling, or playing tennis or team sports. · If your doctor prescribed medicines, take them exactly as prescribed. Call your doctor if you think you are having a problem with your medicine. You will get more details on the specific medicines your doctor prescribes. Follow-up care is a key part of your treatment and safety.  Be sure to make and go to all appointments, and call your doctor if you are having problems. It's also a good idea to know your test results and keep a list of the medicines you take. Where can you learn more? Go to http://song-tigre.info/. Enter O108 in the search box to learn more about \"Learning About High Blood Sugar. \"  Current as of: December 7, 2017  Content Version: 11.7  © 5787-0264 [employer]. Care instructions adapted under license by [employer] (which disclaims liability or warranty for this information). If you have questions about a medical condition or this instruction, always ask your healthcare professional. [place] [address] any warranty or liability for your use of this information. Urinary Tract Infection in Women: Care Instructions  Your Care Instructions    A urinary tract infection, or UTI, is a general term for an infection anywhere between the kidneys and the urethra (where urine comes out). Most UTIs are bladder infections. They often cause pain or burning when you urinate. UTIs are caused by bacteria and can be cured with antibiotics. Be sure to complete your treatment so that the infection goes away. Follow-up care is a key part of your treatment and safety. Be sure to make and go to all appointments, and call your doctor if you are having problems. It's also a good idea to know your test results and keep a list of the medicines you take. How can you care for yourself at home? · Take your antibiotics as directed. Do not stop taking them just because you feel better. You need to take the full course of antibiotics. · Drink extra water and other fluids for the next day or two. This may help wash out the bacteria that are causing the infection. (If you have kidney, heart, or liver disease and have to limit fluids, talk with your doctor before you increase your fluid intake.)  · Avoid drinks that are carbonated or have caffeine. They can irritate the bladder. · Urinate often.  Try to empty your bladder each time. · To relieve pain, take a hot bath or lay a heating pad set on low over your lower belly or genital area. Never go to sleep with a heating pad in place. To prevent UTIs  · Drink plenty of water each day. This helps you urinate often, which clears bacteria from your system. (If you have kidney, heart, or liver disease and have to limit fluids, talk with your doctor before you increase your fluid intake.)  · Urinate when you need to. · Urinate right after you have sex. · Change sanitary pads often. · Avoid douches, bubble baths, feminine hygiene sprays, and other feminine hygiene products that have deodorants. · After going to the bathroom, wipe from front to back. When should you call for help? Call your doctor now or seek immediate medical care if:    · Symptoms such as fever, chills, nausea, or vomiting get worse or appear for the first time.     · You have new pain in your back just below your rib cage. This is called flank pain.     · There is new blood or pus in your urine.     · You have any problems with your antibiotic medicine.    Watch closely for changes in your health, and be sure to contact your doctor if:    · You are not getting better after taking an antibiotic for 2 days.     · Your symptoms go away but then come back. Where can you learn more? Go to http://song-tigre.info/. Enter Y764 in the search box to learn more about \"Urinary Tract Infection in Women: Care Instructions. \"  Current as of: May 12, 2017  Content Version: 11.7  © 7650-9526 [employer]. Care instructions adapted under license by [employer] (which disclaims liability or warranty for this information). If you have questions about a medical condition or this instruction, always ask your healthcare professional. [address] any warranty or liability for your use of this information.

## 2018-09-22 NOTE — ED PROVIDER NOTES
EMERGENCY DEPARTMENT HISTORY AND PHYSICAL EXAM 
 
9:44 PM 
 
 
Date: 9/21/2018 Patient Name: John Villar History of Presenting Illness Chief Complaint Patient presents with  Abdominal Pain  Vomiting  Nasal Congestion  Cough History Provided By: Patient Chief Complaint: abdominal pain Duration:  A few days Timing:  Acute Location: diffuse Quality: burning Severity: 8/10 Modifying Factors: positive sick contact Associated Symptoms: subjective fever. N/V, fatigue, rhinorrhea. Denies cp, sob, dysuria, frequency, diarrhea, hematemesis Additional History (Context): John Villar is a 63 y.o. female presents to the ED for the evaluation of acute, diffuse abdominal pain that began a few days ago. Patient describes her pain as a burning sensation and rates it an 8/10 in severity. She notes positive sick contact and explains that when she feels her abdominal burning, she begins to feel nauseated. She reports associated vomiting x 3 episodes, fatigue, rhinorrhea, and a subjective fever. Denies any diarrhea, hematemesis, rhinorrhea, chest pain, shortness of breath, dysuria, urinary frequency. Patient explains that she was exposed to a family member that was diagnosed with the flu but is unsure if she has it because it she states it may also be due to the food she ate at [employer] earlier this evening. Patient also notes that she has a history of DM and did not take her insulin this evening because she is vomiting. No other complaints or concerns at this time. PCP: Sudhir Maxwell MD 
 
Current Facility-Administered Medications Medication Dose Route Frequency Provider Last Rate Last Dose  prochlorperazine (COMPAZINE) injection 10 mg  10 mg IntraVENous Q6H PRN Zohaib Schwartz MD   10 mg at 09/21/18 7850 Current Outpatient Prescriptions Medication Sig Dispense Refill  ciprofloxacin HCl (CIPRO) 500 mg tablet Take 1 Tab by mouth two (2) times a day for 7 days. 14 Tab 0  
 ondansetron (ZOFRAN ODT) 4 mg disintegrating tablet Take 1 Tab by mouth every eight (8) hours as needed for Nausea. 15 Tab 0  
 fluconazole (DIFLUCAN) 150 mg tablet Take 1 Tab by mouth now for 1 dose. 1 Tab 0  
 insulin glargine (LANTUS U-100 INSULIN) 100 unit/mL injection 16 Units by SubCUTAneous route nightly.  insulin lispro (HUMALOG) 100 unit/mL injection 9 Units by SubCUTAneous route.  gemfibrozil (LOPID) 600 mg tablet TAKE 1 TABLET BY MOUTH TWICE A DAY FOR TRIGLYCERIDES  3  
 ergocalciferol (ERGOCALCIFEROL) 50,000 unit capsule TAKE 1 CAP BY MOUTH ONCE A WEEK  1  
 metFORMIN (GLUCOPHAGE) 1,000 mg tablet TAKE 1 TABLET BY MOUTH TWICE A DAY  3  
 multivitamin (ONE A DAY) tablet Take 1 Tab by mouth daily.  aspirin delayed-release 81 mg tablet Take  by mouth daily. Past History Past Medical History: 
Past Medical History:  
Diagnosis Date  Candidal UTI (urinary tract infection)  Diabetes ([address]  Fracture of femoral neck, right ([address]  History of renal stent  HLD (hyperlipidemia)  Kidney stone  Left sided abdominal pain of unknown cause  Left ureteral stone  Pyelonephritis  Urinary frequency  Vitamin D deficiency Past Surgical History: 
Past Surgical History:  
Procedure Laterality Date  HX APPENDECTOMY  HX CARPAL TUNNEL RELEASE Right  HX  SECTION    
 HX CHOLECYSTECTOMY  HX HYSTERECTOMY  HX OTHER SURGICAL  2016 PELVIS/ HIP JOINT-FRACTURE/ DISLOCATION  
 HX UROLOGICAL  2017  
 cystoscopy bilateral retrograde pyelograms, right 7-French x 24 cm double-J ureteral stent placement.  HX UROLOGICAL  2017 [address]- Cystoscopy, left ureteroscopy, laser lithotripsy, stone extraction, and stent placement, Dr Blanka Whatley Family History: 
Family History Problem Relation Age of Onset  Parkinson's Disease Mother Social History: 
Social History Substance Use Topics  Smoking status: Former Smoker  Smokeless tobacco: Never Used  Alcohol use No  
 
 
Allergies: Allergies Allergen Reactions  Statins-Hmg-Coa Reductase Inhibitors Other (comments) Burning spine Review of Systems Review of Systems Constitutional: Positive for fatigue and fever (subjective). HENT: Positive for rhinorrhea. Respiratory: Negative for shortness of breath. Cardiovascular: Negative for chest pain. Gastrointestinal: Positive for abdominal pain (burning), nausea and vomiting. Negative for diarrhea. (-) hematemesis Genitourinary: Negative for dysuria and frequency. All other systems reviewed and are negative. Visit Vitals  /59  Pulse (!) 109  Temp 98.8 °F (37.1 °C)  Resp 18  Ht 5' 4\" (1.626 m)  Wt 63 kg (139 lb)  SpO2 96%  BMI 23.86 kg/m2  
 
 
100% on RA. Interpretation: non-hypoxic Physical Exam / Medical Decision Making I am the first provider for this patient. I reviewed the vital signs, available nursing notes, past medical history, past surgical history, family history and social history. Vital Signs-Reviewed the patient's vital signs. Physical exam: 
General:  Well-developed, well-nourished, uncomfortable nontoxic nondiaphoretic Head:  Normocephalic atraumatic. Eyes:  Pupils midrange extraocular movements intact. No pallor or conjunctival injection. Nose:  No rhinorrhea, inspection grossly normal.   
Ears:  Grossly normal to inspection, no discharge. Mouth:  Mucous membranes dry , no appreciable intraoral lesion. Neck/Back:  Trachea midline, no asymmetry. Chest:  Grossly normal inspection, symmetric chest rise. Pulmonary:  Clear to auscultation bilaterally no wheezes rhonchi or rales. Intermittent dry cough Cardiovascular:  S1-S2 no murmurs rubs or gallops.    
Abdomen: Soft, minimal epigastric tenderness no RIGHT upper quadrant tenderness negative Garcia sign, negative CVA tenderness, nondistended no guarding rebound or peritoneal signs. Extremities:  Grossly normal to inspection, peripheral pulses intact  all 4 extremities Neurologic:  Alert and oriented no appreciable focal neurologic deficit Skin:  Warm and dry Psychiatric:  Grossly normal mood and affect Nursing note reviewed, vital signs reviewed. ED course: 
Patient with rhinorrhea, cough nausea vomiting no diarrhea, questionable exposure to influenza/viral syndrome, also questionable meal at [employer]. Here she is afebrile and not tachycardic saturation normal on room air, has clinical dehydration plan for IV fluids nausea medication. Symptom control and monitor. Reevaluated after 4 of Zofran, persistent nausea and will trial 2nd antiemetic, urinalysis concerning for infection, started on ceftriaxone Labs unremarkable except for elevated blood sugar in the 300s, LORIN presumably due to dehydration Reevaluated at 0120 hrs.: Feeling much better, abdomen benign, able tolerate by mouth intake and stable for outpatient management unknown etiology of her symptoms, she will be discharged with antibiotic, Diflucan by her request and strict return precautions Patient's presentation, history, physical exam and laboratory evaluations were reviewed. At this time patient was felt to be stable for outpatient management and follow with primary care/specialist.  Patient was instructed to return to the emergency department with any concerns. Disposition: 
 
Discharged home Portions of this chart were created with Dragon medical speech to text program.   Unrecognized errors may be present. Diagnostic Study Results Labs - Recent Results (from the past 12 hour(s)) URINALYSIS W/ RFLX MICROSCOPIC Collection Time: 09/21/18  9:43 PM  
Result Value Ref Range Color YELLOW Appearance CLEAR  Specific gravity 1.010 1.005 - 1.030    
 pH (UA) 5.5 5.0 - 8.0 Protein NEGATIVE  NEG mg/dL Glucose 500 (A) NEG mg/dL Ketone NEGATIVE  NEG mg/dL Bilirubin NEGATIVE  NEG Blood NEGATIVE  NEG Urobilinogen 0.2 0.2 - 1.0 EU/dL Nitrites NEGATIVE  NEG Leukocyte Esterase MODERATE (A) NEG URINE MICROSCOPIC ONLY Collection Time: 09/21/18  9:43 PM  
Result Value Ref Range WBC 20 to 30 0 - 4 /hpf  
 RBC NEGATIVE  0 - 5 /hpf Epithelial cells FEW 0 - 5 /lpf Bacteria FEW (A) NEG /hpf  
GLUCOSE, POC Collection Time: 09/21/18 10:09 PM  
Result Value Ref Range Glucose (POC) 292 (H) 70 - 110 mg/dL CBC WITH AUTOMATED DIFF Collection Time: 09/21/18 10:10 PM  
Result Value Ref Range WBC 9.9 4.6 - 13.2 K/uL  
 RBC 4.46 4.20 - 5.30 M/uL  
 HGB 13.4 12.0 - 16.0 g/dL HCT 38.8 35.0 - 45.0 % MCV 87.0 74.0 - 97.0 FL  
 MCH 30.0 24.0 - 34.0 PG  
 MCHC 34.5 31.0 - 37.0 g/dL  
 RDW 12.3 11.6 - 14.5 % PLATELET 735 359 - 242 K/uL MPV 10.2 9.2 - 11.8 FL  
 NEUTROPHILS 69 40 - 73 % LYMPHOCYTES 20 (L) 21 - 52 % MONOCYTES 6 3 - 10 % EOSINOPHILS 4 0 - 5 % BASOPHILS 1 0 - 2 %  
 ABS. NEUTROPHILS 6.9 1.8 - 8.0 K/UL  
 ABS. LYMPHOCYTES 2.0 0.9 - 3.6 K/UL  
 ABS. MONOCYTES 0.6 0.05 - 1.2 K/UL  
 ABS. EOSINOPHILS 0.4 0.0 - 0.4 K/UL  
 ABS. BASOPHILS 0.1 0.0 - 0.1 K/UL  
 DF AUTOMATED METABOLIC PANEL, COMPREHENSIVE Collection Time: 09/21/18 10:10 PM  
Result Value Ref Range Sodium 133 (L) 136 - 145 mmol/L Potassium 4.3 3.5 - 5.5 mmol/L Chloride 98 (L) 100 - 108 mmol/L  
 CO2 25 21 - 32 mmol/L Anion gap 10 3.0 - 18 mmol/L Glucose 301 (H) 74 - 99 mg/dL BUN 25 (H) 7.0 - 18 MG/DL Creatinine 1.31 (H) 0.6 - 1.3 MG/DL  
 BUN/Creatinine ratio 19 12 - 20 GFR est AA 51 (L) >60 ml/min/1.73m2 GFR est non-AA 42 (L) >60 ml/min/1.73m2 Calcium 10.3 (H) 8.5 - 10.1 MG/DL  Bilirubin, total 0.5 0.2 - 1.0 MG/DL  
 ALT (SGPT) 22 13 - 56 U/L  
 AST (SGOT) 16 15 - 37 U/L  
 Alk. phosphatase 62 45 - 117 U/L Protein, total 8.0 6.4 - 8.2 g/dL Albumin 4.2 3.4 - 5.0 g/dL Globulin 3.8 2.0 - 4.0 g/dL A-G Ratio 1.1 0.8 - 1.7 LIPASE Collection Time: 09/21/18 10:10 PM  
Result Value Ref Range Lipase 266 73 - 393 U/L  
INFLUENZA A & B AG (RAPID TEST) Collection Time: 09/21/18 10:15 PM  
Result Value Ref Range Influenza A Antigen NEGATIVE  NEG Influenza B Antigen NEGATIVE  NEG    
GLUCOSE, POC Collection Time: 09/22/18  1:15 AM  
Result Value Ref Range Glucose (POC) 198 (H) 70 - 110 mg/dL Radiologic Studies - No orders to display Diagnosis Clinical Impression: 1. Acute cystitis without hematuria 2. Viral syndrome 3. Hyperglycemia 4. LORIN (acute kidney injury) ([facility] [address] 5. Dehydration Follow-up Information Follow up With Details Comments Contact Info Dimple Castle MD Call in 2 days  [facility] 
[address] [facility] [address] 
[phone] [facility] EMERGENCY DEPT  As needed, If symptoms worsen [address] [facility] 
[phone] Patient's Medications Start Taking CIPROFLOXACIN HCL (CIPRO) 500 MG TABLET    Take 1 Tab by mouth two (2) times a day for 7 days. FLUCONAZOLE (DIFLUCAN) 150 MG TABLET    Take 1 Tab by mouth now for 1 dose. ONDANSETRON (ZOFRAN ODT) 4 MG DISINTEGRATING TABLET    Take 1 Tab by mouth every eight (8) hours as needed for Nausea. Continue Taking ASPIRIN DELAYED-RELEASE 81 MG TABLET    Take  by mouth daily. ERGOCALCIFEROL (ERGOCALCIFEROL) 50,000 UNIT CAPSULE    TAKE 1 CAP BY MOUTH ONCE A WEEK GEMFIBROZIL (LOPID) 600 MG TABLET    TAKE 1 TABLET BY MOUTH TWICE A DAY FOR TRIGLYCERIDES INSULIN GLARGINE (LANTUS U-100 INSULIN) 100 UNIT/ML INJECTION    16 Units by SubCUTAneous route nightly. INSULIN LISPRO (HUMALOG) 100 UNIT/ML INJECTION    9 Units by SubCUTAneous route. METFORMIN (GLUCOPHAGE) 1,000 MG TABLET    TAKE 1 TABLET BY MOUTH TWICE A DAY MULTIVITAMIN (ONE A DAY) TABLET    Take 1 Tab by mouth daily. These Medications have changed No medications on file Stop Taking FLUCONAZOLE (DIFLUCAN) 150 MG TABLET    Take one tab PO two days after the antibiotics have concluded. FLUCONAZOLE (DIFLUCAN) 150 MG TABLET    Take one tab po two days after antibiotics have concluded ONDANSETRON (ZOFRAN ODT) 4 MG DISINTEGRATING TABLET    Take 1 Tab by mouth every eight (8) hours as needed for Nausea. TRIMETHOPRIM-SULFAMETHOXAZOLE (BACTRIM DS, SEPTRA DS) 160-800 MG PER TABLET    Take 1 Tab by mouth two (2) times a day.  
 
_______________________________ Attestations: 
Scribe Attestation Fabiano Caceres acting as a scribe for and in the presence of Edwyna Lesches, MD     
September 21, 2018 at 9:44 PM 
    
Provider Attestation:     
I personally performed the services described in the documentation, reviewed the documentation, as recorded by the scribe in my presence, and it accurately and completely records my words and actions. September 21, 2018 at 9:44 PM - Edwyna Lesches, MD 
_______________________________

## 2019-04-14 ENCOUNTER — HOSPITAL ENCOUNTER (EMERGENCY)
Age: 59
Discharge: HOME | End: 2019-04-14
Attending: EMERGENCY MEDICINE
Payer: SELF-PAY

## 2019-04-14 VITALS
OXYGEN SATURATION: 96 % | DIASTOLIC BLOOD PRESSURE: 65 MMHG | SYSTOLIC BLOOD PRESSURE: 126 MMHG | RESPIRATION RATE: 18 BRPM | HEART RATE: 93 BPM | TEMPERATURE: 98.3 F

## 2019-04-14 DIAGNOSIS — Z79.4 TYPE 2 DIABETES MELLITUS WITH COMPLICATION, WITH LONG-TERM CURRENT USE OF INSULIN (HCC): ICD-10-CM

## 2019-04-14 DIAGNOSIS — E11.8 TYPE 2 DIABETES MELLITUS WITH COMPLICATION, WITH LONG-TERM CURRENT USE OF INSULIN (HCC): ICD-10-CM

## 2019-04-14 DIAGNOSIS — J01.00 ACUTE MAXILLARY SINUSITIS, RECURRENCE NOT SPECIFIED: Primary | ICD-10-CM

## 2019-04-14 DIAGNOSIS — J20.9 ACUTE BRONCHITIS, UNSPECIFIED ORGANISM: ICD-10-CM

## 2019-04-14 PROCEDURE — 99282 EMERGENCY DEPT VISIT SF MDM: CPT

## 2019-04-14 RX ORDER — ONDANSETRON 4 MG/1
TABLET, ORALLY DISINTEGRATING ORAL
Qty: 10 TAB | Refills: 0 | Status: SHIPPED | OUTPATIENT
Start: 2019-04-14 | End: 2019-06-03

## 2019-04-14 RX ORDER — ALBUTEROL SULFATE 90 UG/1
2 AEROSOL, METERED RESPIRATORY (INHALATION)
Qty: 1 INHALER | Refills: 0 | Status: SHIPPED | OUTPATIENT
Start: 2019-04-14 | End: 2019-06-03

## 2019-04-14 RX ORDER — AZITHROMYCIN 250 MG/1
TABLET, FILM COATED ORAL
Qty: 6 TAB | Refills: 0 | Status: SHIPPED | OUTPATIENT
Start: 2019-04-14 | End: 2019-04-19

## 2019-04-14 NOTE — DISCHARGE INSTRUCTIONS
Patient Education        Bronchitis: Care Instructions  Your Care Instructions    Bronchitis is inflammation of the bronchial tubes, which carry air to the lungs. The tubes swell and produce mucus, or phlegm. The mucus and inflamed bronchial tubes make you cough. You may have trouble breathing. Most cases of bronchitis are caused by viruses like those that cause colds. Antibiotics usually do not help and they may be harmful. Bronchitis usually develops rapidly and lasts about 2 to 3 weeks in otherwise healthy people. Follow-up care is a key part of your treatment and safety. Be sure to make and go to all appointments, and call your doctor if you are having problems. It's also a good idea to know your test results and keep a list of the medicines you take. How can you care for yourself at home? · Take all medicines exactly as prescribed. Call your doctor if you think you are having a problem with your medicine. · Get some extra rest.  · Take an over-the-counter pain medicine, such as acetaminophen (Tylenol), ibuprofen (Advil, Motrin), or naproxen (Aleve) to reduce fever and relieve body aches. Read and follow all instructions on the label. · Do not take two or more pain medicines at the same time unless the doctor told you to. Many pain medicines have acetaminophen, which is Tylenol. Too much acetaminophen (Tylenol) can be harmful. · Take an over-the-counter cough medicine that contains dextromethorphan to help quiet a dry, hacking cough so that you can sleep. Avoid cough medicines that have more than one active ingredient. Read and follow all instructions on the label. · Breathe moist air from a humidifier, hot shower, or sink filled with hot water. The heat and moisture will thin mucus so you can cough it out. · Do not smoke. Smoking can make bronchitis worse. If you need help quitting, talk to your doctor about stop-smoking programs and medicines.  These can increase your chances of quitting for good.  When should you call for help? Call 911 anytime you think you may need emergency care. For example, call if:    · You have severe trouble breathing.    Call your doctor now or seek immediate medical care if:    · You have new or worse trouble breathing.     · You cough up dark brown or bloody mucus (sputum).     · You have a new or higher fever.     · You have a new rash.    Watch closely for changes in your health, and be sure to contact your doctor if:    · You cough more deeply or more often, especially if you notice more mucus or a change in the color of your mucus.     · You are not getting better as expected. Where can you learn more? Go to http://songBeam.tigre.info/. Enter H333 in the search box to learn more about \"Bronchitis: Care Instructions. \"  Current as of: September 5, 2018  Content Version: 11.9  © 9431-3139 [employer]. Care instructions adapted under license by [employer] (which disclaims liability or warranty for this information). If you have questions about a medical condition or this instruction, always ask your healthcare professional. [place] [address] any warranty or liability for your use of this information. Patient Education        Sinusitis: Care Instructions  Your Care Instructions    Sinusitis is an infection of the lining of the sinus cavities in your head. Sinusitis often follows a cold. It causes pain and pressure in your head and face. In most cases, sinusitis gets better on its own in 1 to 2 weeks. But some mild symptoms may last for several weeks. Sometimes antibiotics are needed. Follow-up care is a key part of your treatment and safety. Be sure to make and go to all appointments, and call your doctor if you are having problems. It's also a good idea to know your test results and keep a list of the medicines you take. How can you care for yourself at home?   · Take an over-the-counter pain medicine, such as acetaminophen (Tylenol), ibuprofen (Advil, Motrin), or naproxen (Aleve). Read and follow all instructions on the label. · If the doctor prescribed antibiotics, take them as directed. Do not stop taking them just because you feel better. You need to take the full course of antibiotics. · Be careful when taking over-the-counter cold or flu medicines and Tylenol at the same time. Many of these medicines have acetaminophen, which is Tylenol. Read the labels to make sure that you are not taking more than the recommended dose. Too much acetaminophen (Tylenol) can be harmful. · Breathe warm, moist air from a steamy shower, a hot bath, or a sink filled with hot water. Avoid cold, dry air. Using a humidifier in your home may help. Follow the directions for cleaning the machine. · Use saline (saltwater) nasal washes to help keep your nasal passages open and wash out mucus and bacteria. You can buy saline nose drops at a grocery store or drugstore. Or you can make your own at home by adding 1 teaspoon of salt and 1 teaspoon of baking soda to 2 cups of distilled water. If you make your own, fill a bulb syringe with the solution, insert the tip into your nostril, and squeeze gently. Huang Opitz your nose. · Put a hot, wet towel or a warm gel pack on your face 3 or 4 times a day for 5 to 10 minutes each time. · Try a decongestant nasal spray like oxymetazoline (Afrin). Do not use it for more than 3 days in a row. Using it for more than 3 days can make your congestion worse. When should you call for help?   Call your doctor now or seek immediate medical care if:    · You have new or worse swelling or redness in your face or around your eyes.     · You have a new or higher fever.    Watch closely for changes in your health, and be sure to contact your doctor if:    · You have new or worse facial pain.     · The mucus from your nose becomes thicker (like pus) or has new blood in it.     · You are not getting better as expected. Where can you learn more? Go to http://song-tigre.info/. Enter B633 in the search box to learn more about \"Sinusitis: Care Instructions. \"  Current as of: March 27, 2018  Content Version: 11.9  © 2060-5894 [employer], [employer] [place]. Care instructions adapted under license by [employer] (which disclaims liability or warranty for this information). If you have questions about a medical condition or this instruction, always ask your healthcare professional. [address] any warranty or liability for your use of this information.

## 2019-04-14 NOTE — ED PROVIDER NOTES
EMERGENCY DEPARTMENT HISTORY AND PHYSICAL EXAM 
 
12:23 PM 
 
 
Date: 4/14/2019 Patient Name: Chuck Mata History of Presenting Illness Chief Complaint Patient presents with  Cough  Nasal Congestion History Provided By: patient Additional History (Context): Chuck Mata is a 63 y.o. female presents with a cough and sinus pain for 3 days. She does not smoke, does not have any known chronic lung disease. She is insulin-dependent diabetic. No chest pain. No fevers. She does have some nausea. [place] PCP: Elis, Not On File Chief Complaint:  
Duration:   
Timing: Location:  
Quality:  
Severity:  
Modifying Factors:  
Associated Symptoms:  
 
 
Current Outpatient Medications Medication Sig Dispense Refill  azithromycin (ZITHROMAX Z-DARLENE) 250 mg tablet Take 2 tablets by mouth on day 1, then 1 tablet by mouth daily on days 2 through 5. 6 Tab 0  
 ondansetron (ZOFRAN ODT) 4 mg disintegrating tablet Take 1 Tab by mouth every eight (8) hours as needed for Nausea. 15 Tab 0  
 insulin glargine (LANTUS U-100 INSULIN) 100 unit/mL injection 16 Units by SubCUTAneous route nightly.  insulin lispro (HUMALOG) 100 unit/mL injection 9 Units by SubCUTAneous route.  gemfibrozil (LOPID) 600 mg tablet TAKE 1 TABLET BY MOUTH TWICE A DAY FOR TRIGLYCERIDES  3  
 ergocalciferol (ERGOCALCIFEROL) 50,000 unit capsule TAKE 1 CAP BY MOUTH ONCE A WEEK  1  
 metFORMIN (GLUCOPHAGE) 1,000 mg tablet TAKE 1 TABLET BY MOUTH TWICE A DAY  3  
 multivitamin (ONE A DAY) tablet Take 1 Tab by mouth daily.  aspirin delayed-release 81 mg tablet Take  by mouth daily. Past History Past Medical History: 
Past Medical History:  
Diagnosis Date  Candidal UTI (urinary tract infection)  Diabetes ([address]  Fracture of femoral neck, right ([address]  History of renal stent  HLD (hyperlipidemia)  Kidney stone  Left sided abdominal pain of unknown cause  Left ureteral stone  Pyelonephritis  Urinary frequency  Vitamin D deficiency Past Surgical History: 
Past Surgical History:  
Procedure Laterality Date  HX APPENDECTOMY  HX CARPAL TUNNEL RELEASE Right  HX  SECTION    
 HX CHOLECYSTECTOMY  HX HYSTERECTOMY  HX OTHER SURGICAL  2016 PELVIS/ HIP JOINT-FRACTURE/ DISLOCATION  
 HX UROLOGICAL  2017  
 cystoscopy bilateral retrograde pyelograms, right 7-Burmese x 24 cm double-J ureteral stent placement.  HX UROLOGICAL  2017 [facility]- Cystoscopy, left ureteroscopy, laser lithotripsy, stone extraction, and stent placement, Dr Mac Maria Family History: 
Family History Problem Relation Age of Onset  Parkinson's Disease Mother Social History: 
Social History Tobacco Use  Smoking status: Former Smoker  Smokeless tobacco: Never Used Substance Use Topics  Alcohol use: No  
 Drug use: No  
 
 
Allergies: Allergies Allergen Reactions  Statins-Hmg-Coa Reductase Inhibitors Other (comments) Burning spine Review of Systems Review of Systems Constitutional: Negative for diaphoresis and fever. HENT: Positive for rhinorrhea and sinus pain. Negative for congestion and sore throat. Eyes: Negative for pain and itching. Respiratory: Positive for cough. Negative for shortness of breath. Cardiovascular: Negative for chest pain and palpitations. Gastrointestinal: Negative for abdominal pain and diarrhea. Endocrine: Negative for polydipsia and polyuria. Genitourinary: Negative for dysuria and hematuria. Musculoskeletal: Negative for arthralgias and myalgias. Skin: Negative for rash and wound. Neurological: Negative for seizures and syncope. Hematological: Does not bruise/bleed easily. Psychiatric/Behavioral: Negative for agitation and hallucinations. Physical Exam  
 
 
Patient Vitals for the past 12 hrs: 
 Temp Pulse Resp SpO2  
19 1211  (!) 108 15 97 % 04/14/19 1209 98.3 °F (36.8 °C)    Physical Exam  
Constitutional: She appears well-developed and well-nourished. HENT:  
Head: Normocephalic and atraumatic. Tenderness of the sinuses. Eyes: Conjunctivae are normal. No scleral icterus. Neck: Normal range of motion. Neck supple. No JVD present. Cardiovascular: Normal rate, regular rhythm and normal heart sounds. 4 intact extremity pulses Pulmonary/Chest: Effort normal and breath sounds normal.  
Frequent coughs. Abdominal: Soft. She exhibits no mass. There is no tenderness. Musculoskeletal: Normal range of motion. Lymphadenopathy:  
  She has no cervical adenopathy. Neurological: She is alert. Skin: Skin is warm and dry. Nursing note and vitals reviewed. Diagnostic Study Results Labs - No results found for this or any previous visit (from the past 12 hour(s)). Radiologic Studies - No orders to display No results found. Medications ordered:  
Medications - No data to display Medical Decision Making Initial Medical Decision Making and DDx: 
Consistent with sinusitis and bronchitis in a diabetic, antibiotics are indicated. She is also nauseous so we will give her Zofran. Albuterol prescription. Do not suspect pneumonia or metabolic derangement. She is happy with this plan and ready for discharge. ED Course: Progress Notes, Reevaluation, and Consults: 
  
 
I am the first provider for this patient. I reviewed the vital signs, available nursing notes, past medical history, past surgical history, family history and social history. Patient Vitals for the past 12 hrs: 
 Temp Pulse Resp SpO2  
04/14/19 1211  (!) 108 15 97 % 04/14/19 1209 98.3 °F (36.8 °C)    Vital Signs-Reviewed the patient's vital signs. Pulse Oximetry Analysis, Cardiac Monitor, 12 lead ekg: 
Pulse 108, 97% room air. Interpreted by the EP. Records Reviewed: Old Medical Records (Time of Review: 12:23 PM) Procedures:  
Critical Care Time:  
Aspirin: (was aspirin given for stroke?) Diagnosis Clinical Impression: 1. Acute maxillary sinusitis, recurrence not specified 2. Acute bronchitis, unspecified organism 3. Type 2 diabetes mellitus with complication, with long-term current use of insulin ([facility] Disposition: Discharged Follow-up Information Follow up With Specialties Details Why Contact Info [facility], Not On File    Not On File (63) Patient has a PCP but that physician is not listed in [address] [facility]. Patient's Medications Start Taking AZITHROMYCIN (ZITHROMAX Z-DARLENE) 250 MG TABLET    Take 2 tablets by mouth on day 1, then 1 tablet by mouth daily on days 2 through 5. Continue Taking ASPIRIN DELAYED-RELEASE 81 MG TABLET    Take  by mouth daily. ERGOCALCIFEROL (ERGOCALCIFEROL) 50,000 UNIT CAPSULE    TAKE 1 CAP BY MOUTH ONCE A WEEK GEMFIBROZIL (LOPID) 600 MG TABLET    TAKE 1 TABLET BY MOUTH TWICE A DAY FOR TRIGLYCERIDES INSULIN GLARGINE (LANTUS U-100 INSULIN) 100 UNIT/ML INJECTION    16 Units by SubCUTAneous route nightly. INSULIN LISPRO (HUMALOG) 100 UNIT/ML INJECTION    9 Units by SubCUTAneous route. METFORMIN (GLUCOPHAGE) 1,000 MG TABLET    TAKE 1 TABLET BY MOUTH TWICE A DAY MULTIVITAMIN (ONE A DAY) TABLET    Take 1 Tab by mouth daily. ONDANSETRON (ZOFRAN ODT) 4 MG DISINTEGRATING TABLET    Take 1 Tab by mouth every eight (8) hours as needed for Nausea. These Medications have changed No medications on file Stop Taking No medications on file  
 
_______________________________ Notes: 
Michaelibe Attestmandi Kendall MD using Dragon dictation     
_______________________________

## 2019-06-03 ENCOUNTER — HOSPITAL ENCOUNTER (EMERGENCY)
Age: 59
Discharge: HOME | End: 2019-06-03
Attending: EMERGENCY MEDICINE | Admitting: EMERGENCY MEDICINE
Payer: SELF-PAY

## 2019-06-03 ENCOUNTER — APPOINTMENT (OUTPATIENT)
Dept: GENERAL RADIOLOGY | Age: 59
End: 2019-06-03
Attending: PHYSICIAN ASSISTANT
Payer: SELF-PAY

## 2019-06-03 VITALS
OXYGEN SATURATION: 97 % | RESPIRATION RATE: 16 BRPM | BODY MASS INDEX: 24.24 KG/M2 | DIASTOLIC BLOOD PRESSURE: 90 MMHG | SYSTOLIC BLOOD PRESSURE: 174 MMHG | WEIGHT: 142 LBS | HEIGHT: 64 IN | HEART RATE: 97 BPM | TEMPERATURE: 98.3 F

## 2019-06-03 DIAGNOSIS — S99.921A INJURY OF RIGHT FOOT, INITIAL ENCOUNTER: Primary | ICD-10-CM

## 2019-06-03 DIAGNOSIS — S92.514A CLOSED NONDISPLACED FRACTURE OF PROXIMAL PHALANX OF LESSER TOE OF RIGHT FOOT, INITIAL ENCOUNTER: ICD-10-CM

## 2019-06-03 PROCEDURE — 73630 X-RAY EXAM OF FOOT: CPT

## 2019-06-03 PROCEDURE — 99283 EMERGENCY DEPT VISIT LOW MDM: CPT

## 2019-06-03 PROCEDURE — 77030036687 HC SHOE PSTOP S2SG -A

## 2019-06-03 RX ORDER — NAPROXEN 500 MG/1
500 TABLET ORAL 2 TIMES DAILY WITH MEALS
Qty: 20 TAB | Refills: 0 | Status: SHIPPED | OUTPATIENT
Start: 2019-06-03

## 2019-06-03 RX ORDER — TRAMADOL HYDROCHLORIDE 50 MG/1
50 TABLET ORAL
Qty: 15 TAB | Refills: 0 | Status: SHIPPED | OUTPATIENT
Start: 2019-06-03 | End: 2019-06-06

## 2019-06-03 NOTE — ED NOTES
Fourth and fifth digits luz taped. Ortho shoe applied.  Neurovascular integrity intact before and after application

## 2019-06-03 NOTE — ED PROVIDER NOTES
EMERGENCY DEPARTMENT HISTORY AND PHYSICAL EXAM    Date: 6/3/2019  Patient Name: John Harvey    History of Presenting Illness     Chief Complaint   Patient presents with    Toe Pain         History Provided By:patient    Chief Complaint: foot injury  Duration: 4 days  Timing:  acute  Location: R foot  Quality: throbbing  Severity:moderate  Modifying Factors: worse on ambulation  Associated Symptoms: swelling and bruising      Additional History (Context): John Harvey is a 63 y.o. female with PMH DM,  who presents with c/o pain and swelling to the right foot over the areas of the third through fifth digits. Patient states on Friday she stubbed her foot into a high chair. Pain is worse on ambulation. Patient denies treatment prior to arrival.  No other complaints at this time. PCP: Jose Angel, MD [place]    Current Outpatient Medications   Medication Sig Dispense Refill    insulin glargine (LANTUS U-100 INSULIN) 100 unit/mL injection 16 Units by SubCUTAneous route nightly.  insulin lispro (HUMALOG) 100 unit/mL injection 9 Units by SubCUTAneous route.  gemfibrozil (LOPID) 600 mg tablet TAKE 1 TABLET BY MOUTH TWICE A DAY FOR TRIGLYCERIDES  3    ergocalciferol (ERGOCALCIFEROL) 50,000 unit capsule TAKE 1 CAP BY MOUTH ONCE A WEEK  1    metFORMIN (GLUCOPHAGE) 1,000 mg tablet TAKE 1 TABLET BY MOUTH TWICE A DAY  3    multivitamin (ONE A DAY) tablet Take 1 Tab by mouth daily.  aspirin delayed-release 81 mg tablet Take  by mouth daily.          Past History     Past Medical History:  Past Medical History:   Diagnosis Date    Candidal UTI (urinary tract infection)     Diabetes ([facility]     Fracture of femoral neck, right (HCC)     History of renal stent     HLD (hyperlipidemia)     Kidney stone     Left sided abdominal pain of unknown cause     Left ureteral stone     Pyelonephritis     Urinary frequency     Vitamin D deficiency        Past Surgical History:  Past Surgical History:   Procedure Laterality Date    HX APPENDECTOMY      HX CARPAL TUNNEL RELEASE Right     HX  SECTION      HX CHOLECYSTECTOMY      HX HYSTERECTOMY      HX OTHER SURGICAL  2016    PELVIS/ HIP JOINT-FRACTURE/ DISLOCATION    HX UROLOGICAL  2017    cystoscopy bilateral retrograde pyelograms, right 7-Maltese x 24 cm double-J ureteral stent placement.  HX UROLOGICAL  2017     [facility]- Cystoscopy, left ureteroscopy, laser lithotripsy, stone extraction, and stent placement, Dr Delmis Acosta       Family History:  Family History   Problem Relation Age of Onset    Parkinson's Disease Mother        Social History:  Social History     Tobacco Use    Smoking status: Former Smoker    Smokeless tobacco: Never Used   Substance Use Topics    Alcohol use: No    Drug use: No       Allergies: Allergies   Allergen Reactions    Statins-Hmg-Coa Reductase Inhibitors Other (comments)     Burning spine         Review of Systems   Review of Systems   Constitutional: Negative. Negative for chills and fever. HENT: Negative. Negative for congestion, ear pain and rhinorrhea. Eyes: Negative. Negative for pain and redness. Respiratory: Negative. Negative for cough, shortness of breath, wheezing and stridor. Cardiovascular: Negative. Negative for chest pain and leg swelling. Gastrointestinal: Negative. Negative for abdominal pain, constipation, diarrhea, nausea and vomiting. Genitourinary: Negative. Negative for dysuria and frequency. Musculoskeletal: Positive for arthralgias. Negative for back pain and neck pain. Skin: Negative. Negative for rash and wound. Neurological: Negative. Negative for dizziness, seizures, syncope and headaches. All other systems reviewed and are negative.     All Other Systems Negative  Physical Exam     Vitals:    19 1107   BP: 174/90   Pulse: 97   Resp: 16   Temp: 98.3 °F (36.8 °C)   SpO2: 97%   Weight: 64.4 kg (142 lb)   Height: 5' 4\" (1.626 m)     Physical Exam Constitutional: She is oriented to person, place, and time. She appears well-developed and well-nourished. No distress. HENT:   Head: Normocephalic and atraumatic. Eyes: Conjunctivae are normal. Right eye exhibits no discharge. Left eye exhibits no discharge. No scleral icterus. Neck: Normal range of motion. Neck supple. Cardiovascular: Normal rate and intact distal pulses. Pulmonary/Chest: Effort normal. No stridor. No respiratory distress. Musculoskeletal: Normal range of motion. She exhibits edema and tenderness. She exhibits no deformity. RLE: intact pulses, no obvious deformity noted, edema noted over the 5th digit dorsally, TTP and ecchymosis noted to the 3rd, 4th, and 5th digits. ROM intact. Neurological: She is alert and oriented to person, place, and time. Coordination normal.   Gait is steady. Able to ambulate without difficulty. Skin: Skin is warm and dry. No rash noted. She is not diaphoretic. No erythema. Psychiatric: She has a normal mood and affect. Her behavior is normal. Thought content normal.   Nursing note and vitals reviewed. Diagnostic Study Results     Labs -   No results found for this or any previous visit (from the past 12 hour(s)). Radiologic Studies -   XR FOOT RT MIN 3 V    (Results Pending)   fracture to the 5th proximal phalanx  CT Results  (Last 48 hours)    None        CXR Results  (Last 48 hours)    None            Medical Decision Making   I am the first provider for this patient. I reviewed the vital signs, available nursing notes, past medical history, past surgical history, family history and social history. Vital Signs-Reviewed the patient's vital signs. Records Reviewed: Chual Wong PA-C     Procedures:  Procedures    Provider Notes (Medical Decision Making): Impression: foot injury    X-rays fracture noted to the 5th proximal phalanx    Will plan to place pt ortho shoe and refer for podiatry follow-up.  Naproxen and ultram for pain. Pt agrees. Chula Wong PA-C 12:15 PM     MED RECONCILIATION:  No current facility-administered medications for this encounter. Current Outpatient Medications   Medication Sig    insulin glargine (LANTUS U-100 INSULIN) 100 unit/mL injection 16 Units by SubCUTAneous route nightly.  insulin lispro (HUMALOG) 100 unit/mL injection 9 Units by SubCUTAneous route.  gemfibrozil (LOPID) 600 mg tablet TAKE 1 TABLET BY MOUTH TWICE A DAY FOR TRIGLYCERIDES    ergocalciferol (ERGOCALCIFEROL) 50,000 unit capsule TAKE 1 CAP BY MOUTH ONCE A WEEK    metFORMIN (GLUCOPHAGE) 1,000 mg tablet TAKE 1 TABLET BY MOUTH TWICE A DAY    multivitamin (ONE A DAY) tablet Take 1 Tab by mouth daily.  aspirin delayed-release 81 mg tablet Take  by mouth daily. Disposition:  D/c    DISCHARGE NOTE:   Patient is stable for discharge at this time. Rx for naproxen and ultram given. Rest and follow-up with PCP this week. Return to the ED immediately for any new or worsening sx. Chula Wong PA-C 12:15 PM             Diagnosis     Clinical Impression:   1. Injury of right foot, initial encounter    2.  Closed nondisplaced fracture of proximal phalanx of lesser toe of right foot, initial encounter

## 2019-06-03 NOTE — ED TRIAGE NOTES
R foot, fifth digit,stubbed toe on Friday, painful and swollen and some bruising to the 2nd -4th digits

## 2019-06-03 NOTE — DISCHARGE INSTRUCTIONS
[employer] Activation    Thank you for requesting access to [employer]. Please follow the instructions below to securely access and download your online medical record. [employer] allows you to send messages to your doctor, view your test results, renew your prescriptions, schedule appointments, and more. How Do I Sign Up? 1. In your internet browser, go to [email or web address]  2. Click on the First Time User? Click Here link in the Sign In box. You will be redirect to the New Member Sign Up page. 3. Enter your [employer] Access Code exactly as it appears below. You will not need to use this code after youve completed the sign-up process. If you do not sign up before the expiration date, you must request a new code. [employer] Access Code: [email or web address]  Expires: 2019 11:05 AM (This is the date your [employer] access code will )    4. Enter the last four digits of your Social Security Number (xxxx) and Date of Birth (mm/dd/yyyy) as indicated and click Submit. You will be taken to the next sign-up page. 5. Create a [employer] ID. This will be your [employer] login ID and cannot be changed, so think of one that is secure and easy to remember. 6. Create a [employer] password. You can change your password at any time. 7. Enter your Password Reset Question and Answer. This can be used at a later time if you forget your password. 8. Enter your e-mail address. You will receive e-mail notification when new information is available in [address]. 9. Click Sign Up. You can now view and download portions of your medical record. 10. Click the Download Summary menu link to download a portable copy of your medical information. Additional Information    If you have questions, please visit the Frequently Asked Questions section of the [employer] website at [email or web address] Remember, [employer] is NOT to be used for urgent needs. For medical emergencies, dial 911.        Complete all medications as prescribed. Follow-up with primary care doctor/podiatry in 1 week. Return to the ED immediately for any new or worsening symptoms.

## 2023-12-15 ENCOUNTER — NEW PATIENT (OUTPATIENT)
Dept: RURAL CLINIC 1 | Facility: CLINIC | Age: 63
End: 2023-12-15

## 2023-12-15 DIAGNOSIS — H52.4: ICD-10-CM

## 2023-12-15 DIAGNOSIS — H25.13: ICD-10-CM

## 2023-12-15 DIAGNOSIS — E11.9: ICD-10-CM

## 2023-12-15 PROCEDURE — 92004 COMPRE OPH EXAM NEW PT 1/>: CPT

## 2023-12-15 PROCEDURE — 92015 DETERMINE REFRACTIVE STATE: CPT

## 2023-12-15 ASSESSMENT — TONOMETRY
OS_IOP_MMHG: 17
OD_IOP_MMHG: 17

## 2023-12-15 ASSESSMENT — VISUAL ACUITY
OS_SC: 20/25
OD_SC: 20/40+3
OU_SC: 20/25